# Patient Record
Sex: MALE | Race: WHITE | NOT HISPANIC OR LATINO
[De-identification: names, ages, dates, MRNs, and addresses within clinical notes are randomized per-mention and may not be internally consistent; named-entity substitution may affect disease eponyms.]

---
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
## 2023-12-26 ENCOUNTER — APPOINTMENT (OUTPATIENT)
Dept: ORTHOPEDIC SURGERY | Facility: CLINIC | Age: 23
End: 2023-12-26
Payer: COMMERCIAL

## 2023-12-26 VITALS — WEIGHT: 300 LBS | BODY MASS INDEX: 40.63 KG/M2 | HEIGHT: 72 IN

## 2023-12-26 DIAGNOSIS — S23.41XA SPRAIN OF RIBS, INITIAL ENCOUNTER: ICD-10-CM

## 2023-12-26 PROCEDURE — 71100 X-RAY EXAM RIBS UNI 2 VIEWS: CPT | Mod: LT

## 2023-12-26 PROCEDURE — 99203 OFFICE O/P NEW LOW 30 MIN: CPT

## 2023-12-26 RX ORDER — CYCLOBENZAPRINE HYDROCHLORIDE 10 MG/1
10 TABLET, FILM COATED ORAL
Qty: 30 | Refills: 1 | Status: ACTIVE | COMMUNITY
Start: 2023-12-26 | End: 1900-01-01

## 2023-12-26 NOTE — PHYSICAL EXAM
[Normal Mood and Affect] : normal mood and affect [Able to Communicate] : able to communicate [Well Developed] : well developed [Well Nourished] : well nourished [NL (90)] : forward flexion 90 degrees [NL (30)] : right lateral bending 30 degrees [NL (45)] : extension 45 degrees [NL (40)] : right lateral bending 40 degrees [Left] : left rib [No bony abnormalities] : No bony abnormalities [FreeTextEntry3] : Some pain when exhaling. [FreeTextEntry8] : Tenderness left T8-T9 midaxillary line.  [FreeTextEntry9] : Left side rib pain with extension and left bending, and right rotation.

## 2023-12-26 NOTE — PLAN
[TextEntry] : The patient was advised of the diagnosis.  The natural history of the pathology was explained in full to the patient in layman's terms.  All questions were answered.  The risks and benefits of surgical and nonsurgical treatment alternatives were explained in full to the patient.   Avoid twisting and turning, heavy lifting.  Hot baths or apply heat.    Recommend over the counter medications on as needed basis.    Flexeril that he already has.  He was reassured that this will resolve with some time.

## 2023-12-26 NOTE — HISTORY OF PRESENT ILLNESS
[9] : 9 [1] : 2 [Sharp] : sharp [Constant] : constant [Meds] : meds [Extending back] : extending back [Lying in bed] : lying in bed [Full time] : Work status: full time [de-identified] : 12/26/23:  Initial visit for this 23 year old male who may have pulled his lt ribcage x last few weeks duration. Now c/o lt sided ribcage pain since. Has slight pain taking a deep breath and turning in bed. Went to  x 5 days ago where he had xrays which were negative for a fx. D/c'd home on steroids (40 mg taper for 5 days) and a muscle relaxant prn sleep.    PMH: NO prior rib injuries. 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